# Patient Record
Sex: MALE | Race: WHITE | NOT HISPANIC OR LATINO | Employment: OTHER | URBAN - NONMETROPOLITAN AREA
[De-identification: names, ages, dates, MRNs, and addresses within clinical notes are randomized per-mention and may not be internally consistent; named-entity substitution may affect disease eponyms.]

---

## 2024-07-17 ENCOUNTER — TELEPHONE (OUTPATIENT)
Dept: CARDIOLOGY | Facility: CLINIC | Age: 64
End: 2024-07-17

## 2024-07-17 NOTE — TELEPHONE ENCOUNTER
Caller: Trenton Robbins    Relationship: Self    Best call back number: [phone]     What is the best time to reach you: ANYTIME    Who are you requesting to speak with (clinical staff, provider,  specific staff member): PROVIDER      What was the call regarding: PT JUST WANTS TO UPDATE MALATHI NICHOLS AND DR. BURROWS ABOUT SOME MEDICAL CHANGES IN THE PAST BEFORE APPT. CHANGES ARE AS FOLLOWS:  SEPTEMBER 12TH HAD STROKE -- TRANSFERRED TO [facility] AND WAS THERE FOR 12 DAYS  DR. ELIZONDO. -- DID COLON RESECTION AFTER DX OF COLON CANCER IN DECEMBER.  DX OF PROSTATE CANCER RECENTLY.  RIGHT AND LEFT CAROTID BLOCKAGES REMOVED RECENTLY AS WELL BY DR. AKASH LYNN -- SEE HER NEXT WEEK.   DR. LAN -- UROLOGIST-- WILL BE DOING UPCOMING SURGERY FOR PROSTATE CANCER.       Is it okay if the provider responds through [employer]: NO, PLEASE CALL IF NEEDED. PT DOES NOT NEED A CALL BACK NECESSARILY, JUST WANTED TO UPDATE PROVIDERS.

## 2024-07-18 ENCOUNTER — OFFICE VISIT (OUTPATIENT)
Dept: CARDIOLOGY | Facility: CLINIC | Age: 64
End: 2024-07-18
Payer: MEDICARE

## 2024-07-18 VITALS
DIASTOLIC BLOOD PRESSURE: 92 MMHG | WEIGHT: 216 LBS | BODY MASS INDEX: 31.99 KG/M2 | SYSTOLIC BLOOD PRESSURE: 161 MMHG | OXYGEN SATURATION: 97 % | HEIGHT: 69 IN

## 2024-07-18 DIAGNOSIS — I65.23 BILATERAL CAROTID ARTERY STENOSIS: Primary | ICD-10-CM

## 2024-07-18 DIAGNOSIS — E78.00 PURE HYPERCHOLESTEROLEMIA: ICD-10-CM

## 2024-07-18 DIAGNOSIS — I10 PRIMARY HYPERTENSION: ICD-10-CM

## 2024-07-18 PROCEDURE — 3080F DIAST BP >= 90 MM HG: CPT | Performed by: PHYSICIAN ASSISTANT

## 2024-07-18 PROCEDURE — 3077F SYST BP >= 140 MM HG: CPT | Performed by: PHYSICIAN ASSISTANT

## 2024-07-18 PROCEDURE — 99214 OFFICE O/P EST MOD 30 MIN: CPT | Performed by: PHYSICIAN ASSISTANT

## 2024-07-18 RX ORDER — AMLODIPINE BESYLATE 10 MG/1
10 TABLET ORAL DAILY
COMMUNITY

## 2024-07-18 NOTE — PROGRESS NOTES
Problem list     Subjective   SAADIA Robbins is a 63 y.o. male     Chief Complaint   Patient presents with    Yearly follow up     Primary HTN   Problem list  1.  CVA  1.1 acute infarct of the posterior limb of the right internal capsule near the expected location of the corticospinal tract September 2023  1.2 echocardiogram with no evidence of intracardiac thrombus nor findings to suggest PFO or ASD  1.3 bilateral carotid artery stenosis with high-grade disease involving the left and right carotid with high-grade disease involving the vertebrals  1.4 status post left and right endarterectomies performed in 2024 by Dr. Rojas, inadequate data  2.  Hypertension  3.  Dyslipidemia  4.  Prostate cancer    HPI    Patient is a 63-year-old male presenting to the office for routine cardiac assessment.  Patient feels remarkably well.  Patient is currently undergoing treatment regards to prostate cancer.    He has no chest pain or shortness of breath at all.  He feels no discomfort or any type of dyspnea in any regard.  He does not describe PND or orthopnea.    Patient does not describe palpitating nor does patient complain of dysrhythmic symptoms.  Patient is stable otherwise.      Since patient was seen last, he did undergo hospitalization due to CVA.  Patient recovered.  He ultimately had bilateral carotid endarterectomies this year.  Patient has had no recurrent events to suggest embolic phenomena.  Patient on appropriate medical regimen.  He feels stable.  He does not describe any palpitations.  Otherwise, patient is stable.    Current Outpatient Medications on File Prior to Visit   Medication Sig Dispense Refill    amLODIPine (NORVASC) 10 MG tablet Take 1 tablet by mouth Daily.      aspirin 81 MG EC tablet Take 1 tablet by mouth Daily. 30 tablet 5    levothyroxine (SYNTHROID, LEVOTHROID) 75 MCG tablet Take 1 tablet by mouth Daily.      losartan (COZAAR) 100 MG tablet Take 1 tablet by mouth Daily.      metoprolol tartrate  (LOPRESSOR) 25 MG tablet Take 1 tablet by mouth 2 (Two) Times a Day.      Rosuvastatin Calcium 20 MG capsule sprinkle Take 20 mg by mouth Daily.      Polyethylene Glycol powder       vitamin B-12 (CYANOCOBALAMIN) 500 MCG tablet Take 1 tablet by mouth Daily.      [DISCONTINUED] amLODIPine (NORVASC) 5 MG tablet Take 1 tablet by mouth Daily. (Patient taking differently: Take  by mouth Daily.) 30 tablet 11     No current facility-administered medications on file prior to visit.       Patient has no known allergies.    Past Medical History:   Diagnosis Date    History of underactive thyroid     Hypertension     Prostate cancer     Stroke 09/2023       Social History     Socioeconomic History    Marital status: Single   Tobacco Use    Smoking status: Former     Types: Cigarettes    Smokeless tobacco: Never   Substance and Sexual Activity    Alcohol use: Yes     Alcohol/week: 6.0 standard drinks of alcohol     Types: 6 Cans of beer per week    Drug use: Never    Sexual activity: Defer       Family History   Problem Relation Age of Onset    Atrial fibrillation Mother        Review of Systems   Constitutional: Negative.  Negative for activity change, appetite change, chills, fatigue and fever.   HENT: Negative.  Negative for congestion, sinus pressure and sinus pain.    Eyes: Negative.  Negative for visual disturbance.   Respiratory:  Negative for apnea, cough, chest tightness, shortness of breath and wheezing.    Cardiovascular: Negative.  Negative for chest pain, palpitations and leg swelling.   Gastrointestinal: Negative.  Negative for blood in stool.   Endocrine: Negative.  Negative for cold intolerance and heat intolerance.   Genitourinary: Negative.  Negative for hematuria.   Musculoskeletal:  Negative for gait problem.   Skin: Negative.  Negative for color change, rash and wound.   Allergic/Immunologic: Negative.  Negative for environmental allergies and food allergies.   Neurological:  Negative for dizziness,  "syncope, weakness, light-headedness and headaches.   Hematological: Negative.  Does not bruise/bleed easily.   Psychiatric/Behavioral:  Positive for sleep disturbance.        Objective   Vitals:    07/18/24 1309   BP: 161/92   BP Location: Left arm   Patient Position: Sitting   Cuff Size: Adult   SpO2: 97%   Weight: 98 kg (216 lb)   Height: 175.3 cm (69\")      /92 (BP Location: Left arm, Patient Position: Sitting, Cuff Size: Adult)   Ht 175.3 cm (69\")   Wt 98 kg (216 lb)   SpO2 97%   BMI 31.90 kg/m²     Lab Results (most recent)       None            Physical Exam  Vitals and nursing note reviewed.   Constitutional:       General: He is not in acute distress.     Appearance: Normal appearance. He is well-developed.   HENT:      Head: Normocephalic and atraumatic.   Eyes:      General: No scleral icterus.        Right eye: No discharge.         Left eye: No discharge.      Conjunctiva/sclera: Conjunctivae normal.   Neck:      Vascular: No carotid bruit.   Cardiovascular:      Rate and Rhythm: Normal rate and regular rhythm.      Heart sounds: Normal heart sounds. No murmur heard.     No friction rub. No gallop.   Pulmonary:      Effort: Pulmonary effort is normal. No respiratory distress.      Breath sounds: Normal breath sounds. No wheezing or rales.   Chest:      Chest wall: No tenderness.   Musculoskeletal:      Right lower leg: No edema.      Left lower leg: No edema.   Skin:     General: Skin is warm and dry.      Coloration: Skin is not pale.      Findings: No erythema or rash.   Neurological:      Mental Status: He is alert and oriented to person, place, and time.      Cranial Nerves: No cranial nerve deficit.   Psychiatric:         Behavior: Behavior normal.         Procedure   Procedures       Assessment & Plan     Problems Addressed this Visit          Cardiac and Vasculature    Stenosis of carotid artery - Primary    Pure hypercholesterolemia    Primary hypertension    Relevant Medications    " amLODIPine (NORVASC) 10 MG tablet     Diagnoses         Codes Comments    Bilateral carotid artery stenosis    -  Primary ICD-10-CM: I65.23  ICD-9-CM: 433.10, 433.30     Primary hypertension     ICD-10-CM: I10  ICD-9-CM: 401.9     Pure hypercholesterolemia     ICD-10-CM: E78.00  ICD-9-CM: 272.0             Recommendations  1.  Patient is a 63-year-old male with bilateral carotid artery stenosis status post endarterectomy.  I reviewed last lipid parameters and they are controlled.  Patient doing well with no recurrent events.  There is vertebral disease.  Patient is following with Dr. Bob banda who is managing his vascular disease.  Patient is to undergo carotid duplex soon.  In regards to CVA, it is very likely that he has high-grade carotid and vertebral disease were contributing.  Patient does not complain of palpitations.  We discussed cardiac testing but he is not interested.  I will consider event monitor in the future just to evaluate and rule out atrial fibrillation but he does not complain of any recurrent events and is not complaining of palpitations with high-grade vertebral and carotid disease discovered.  Patient is following with vascular surgery.  We can monitor for now.    2.  Patient with baseline hypertension.  It is elevated today but his blood pressures were better at home.  For now, we can monitor.    3.  Patient with dyslipidemia, but again, levels are well-controlled.  Patient is doing well otherwise and we will monitor.    4.  For now, patient has no chest pain or dyspnea.  We discussed cardiac testing.  The only reason we would essentially consider stress test was because of his high-grade vascular disease but he has no symptoms.  He feels he is doing well and is undergoing prostate cancer treatment.  For now, we can see him back for follow-up in 6 months and counseled him in regards to symptoms to monitor for such as chest pain and dyspnea.  If he were to experience this, will him to  call the office.  Follow-up with primary as scheduled.         Patient brought in medicine list to appointment, it's been reviewed with patient and med list was updated in the chart.      Advance Care Planning   ACP discussion was declined by the patient. Patient does not have an advance directive, declines further assistance.        Electronically signed by:

## 2024-07-18 NOTE — LETTER
July 18, 2024       No Recipients    Patient: SAADIA Robbins   YOB: 1960   Date of Visit: 7/18/2024       Dear SETH Diaz    SAADIA Robbins was in my office today. Below is a copy of my note.    If you have questions, please do not hesitate to call me. I look forward to following SAADIA along with you.         Sincerely,        MYRIAM To        CC:   No Recipients    Problem list     Subjective  SAADIA Robbins is a 63 y.o. male     Chief Complaint   Patient presents with   • Yearly follow up     Primary HTN   Problem list  1.  CVA  1.1 acute infarct of the posterior limb of the right internal capsule near the expected location of the corticospinal tract September 2023  1.2 echocardiogram with no evidence of intracardiac thrombus nor findings to suggest PFO or ASD  1.3 bilateral carotid artery stenosis with high-grade disease involving the left and right carotid with high-grade disease involving the vertebrals  1.4 status post left and right endarterectomies performed in 2024 by Dr. Rojas, inadequate data  2.  Hypertension  3.  Dyslipidemia  4.  Prostate cancer    HPI    Patient is a 63-year-old male presenting to the office for routine cardiac assessment.  Patient feels remarkably well.  Patient is currently undergoing treatment regards to prostate cancer.    He has no chest pain or shortness of breath at all.  He feels no discomfort or any type of dyspnea in any regard.  He does not describe PND or orthopnea.    Patient does not describe palpitating nor does patient complain of dysrhythmic symptoms.  Patient is stable otherwise.      Since patient was seen last, he did undergo hospitalization due to CVA.  Patient recovered.  He ultimately had bilateral carotid endarterectomies this year.  Patient has had no recurrent events to suggest embolic phenomena.  Patient on appropriate medical regimen.  He feels stable.  He does not describe any palpitations.  Otherwise, patient is  stable.    Current Outpatient Medications on File Prior to Visit   Medication Sig Dispense Refill   • amLODIPine (NORVASC) 10 MG tablet Take 1 tablet by mouth Daily.     • aspirin 81 MG EC tablet Take 1 tablet by mouth Daily. 30 tablet 5   • levothyroxine (SYNTHROID, LEVOTHROID) 75 MCG tablet Take 1 tablet by mouth Daily.     • losartan (COZAAR) 100 MG tablet Take 1 tablet by mouth Daily.     • metoprolol tartrate (LOPRESSOR) 25 MG tablet Take 1 tablet by mouth 2 (Two) Times a Day.     • Rosuvastatin Calcium 20 MG capsule sprinkle Take 20 mg by mouth Daily.     • Polyethylene Glycol powder      • vitamin B-12 (CYANOCOBALAMIN) 500 MCG tablet Take 1 tablet by mouth Daily.     • [DISCONTINUED] amLODIPine (NORVASC) 5 MG tablet Take 1 tablet by mouth Daily. (Patient taking differently: Take  by mouth Daily.) 30 tablet 11     No current facility-administered medications on file prior to visit.       Patient has no known allergies.    Past Medical History:   Diagnosis Date   • History of underactive thyroid    • Hypertension    • Prostate cancer    • Stroke 09/2023       Social History     Socioeconomic History   • Marital status: Single   Tobacco Use   • Smoking status: Former     Types: Cigarettes   • Smokeless tobacco: Never   Substance and Sexual Activity   • Alcohol use: Yes     Alcohol/week: 6.0 standard drinks of alcohol     Types: 6 Cans of beer per week   • Drug use: Never   • Sexual activity: Defer       Family History   Problem Relation Age of Onset   • Atrial fibrillation Mother        Review of Systems   Constitutional: Negative.  Negative for activity change, appetite change, chills, fatigue and fever.   HENT: Negative.  Negative for congestion, sinus pressure and sinus pain.    Eyes: Negative.  Negative for visual disturbance.   Respiratory:  Negative for apnea, cough, chest tightness, shortness of breath and wheezing.    Cardiovascular: Negative.  Negative for chest pain, palpitations and leg swelling.  "  Gastrointestinal: Negative.  Negative for blood in stool.   Endocrine: Negative.  Negative for cold intolerance and heat intolerance.   Genitourinary: Negative.  Negative for hematuria.   Musculoskeletal:  Negative for gait problem.   Skin: Negative.  Negative for color change, rash and wound.   Allergic/Immunologic: Negative.  Negative for environmental allergies and food allergies.   Neurological:  Negative for dizziness, syncope, weakness, light-headedness and headaches.   Hematological: Negative.  Does not bruise/bleed easily.   Psychiatric/Behavioral:  Positive for sleep disturbance.        Objective  Vitals:    07/18/24 1309   BP: 161/92   BP Location: Left arm   Patient Position: Sitting   Cuff Size: Adult   SpO2: 97%   Weight: 98 kg (216 lb)   Height: 175.3 cm (69\")      /92 (BP Location: Left arm, Patient Position: Sitting, Cuff Size: Adult)   Ht 175.3 cm (69\")   Wt 98 kg (216 lb)   SpO2 97%   BMI 31.90 kg/m²     Lab Results (most recent)       None            Physical Exam  Vitals and nursing note reviewed.   Constitutional:       General: He is not in acute distress.     Appearance: Normal appearance. He is well-developed.   HENT:      Head: Normocephalic and atraumatic.   Eyes:      General: No scleral icterus.        Right eye: No discharge.         Left eye: No discharge.      Conjunctiva/sclera: Conjunctivae normal.   Neck:      Vascular: No carotid bruit.   Cardiovascular:      Rate and Rhythm: Normal rate and regular rhythm.      Heart sounds: Normal heart sounds. No murmur heard.     No friction rub. No gallop.   Pulmonary:      Effort: Pulmonary effort is normal. No respiratory distress.      Breath sounds: Normal breath sounds. No wheezing or rales.   Chest:      Chest wall: No tenderness.   Musculoskeletal:      Right lower leg: No edema.      Left lower leg: No edema.   Skin:     General: Skin is warm and dry.      Coloration: Skin is not pale.      Findings: No erythema or rash. "   Neurological:      Mental Status: He is alert and oriented to person, place, and time.      Cranial Nerves: No cranial nerve deficit.   Psychiatric:         Behavior: Behavior normal.         Procedure  Procedures       Assessment & Plan    Problems Addressed this Visit          Cardiac and Vasculature    Stenosis of carotid artery - Primary    Pure hypercholesterolemia    Primary hypertension    Relevant Medications    amLODIPine (NORVASC) 10 MG tablet     Diagnoses         Codes Comments    Bilateral carotid artery stenosis    -  Primary ICD-10-CM: I65.23  ICD-9-CM: 433.10, 433.30     Primary hypertension     ICD-10-CM: I10  ICD-9-CM: 401.9     Pure hypercholesterolemia     ICD-10-CM: E78.00  ICD-9-CM: 272.0             Recommendations  1.  Patient is a 63-year-old male with bilateral carotid artery stenosis status post endarterectomy.  I reviewed last lipid parameters and they are controlled.  Patient doing well with no recurrent events.  There is vertebral disease.  Patient is following with Dr. Bob banda who is managing his vascular disease.  Patient is to undergo carotid duplex soon.  In regards to CVA, it is very likely that he has high-grade carotid and vertebral disease were contributing.  Patient does not complain of palpitations.  We discussed cardiac testing but he is not interested.  I will consider event monitor in the future just to evaluate and rule out atrial fibrillation but he does not complain of any recurrent events and is not complaining of palpitations with high-grade vertebral and carotid disease discovered.  Patient is following with vascular surgery.  We can monitor for now.    2.  Patient with baseline hypertension.  It is elevated today but his blood pressures were better at home.  For now, we can monitor.    3.  Patient with dyslipidemia, but again, levels are well-controlled.  Patient is doing well otherwise and we will monitor.    4.  For now, patient has no chest pain or  dyspnea.  We discussed cardiac testing.  The only reason we would essentially consider stress test was because of his high-grade vascular disease but he has no symptoms.  He feels he is doing well and is undergoing prostate cancer treatment.  For now, we can see him back for follow-up in 6 months and counseled him in regards to symptoms to monitor for such as chest pain and dyspnea.  If he were to experience this, will him to call the office.  Follow-up with primary as scheduled.         Patient brought in medicine list to appointment, it's been reviewed with patient and med list was updated in the chart.      Advance Care Planning  ACP discussion was declined by the patient. Patient does not have an advance directive, declines further assistance.        Electronically signed by:

## 2025-02-27 ENCOUNTER — OFFICE VISIT (OUTPATIENT)
Dept: CARDIOLOGY | Facility: CLINIC | Age: 65
End: 2025-02-27
Payer: MEDICARE

## 2025-02-27 VITALS
HEIGHT: 69 IN | SYSTOLIC BLOOD PRESSURE: 121 MMHG | DIASTOLIC BLOOD PRESSURE: 73 MMHG | WEIGHT: 211 LBS | HEART RATE: 91 BPM | OXYGEN SATURATION: 94 % | BODY MASS INDEX: 31.25 KG/M2

## 2025-02-27 DIAGNOSIS — I10 PRIMARY HYPERTENSION: ICD-10-CM

## 2025-02-27 DIAGNOSIS — I65.29 STENOSIS OF CAROTID ARTERY, UNSPECIFIED LATERALITY: Primary | ICD-10-CM

## 2025-02-27 DIAGNOSIS — E78.00 PURE HYPERCHOLESTEROLEMIA: ICD-10-CM

## 2025-02-27 PROCEDURE — 99213 OFFICE O/P EST LOW 20 MIN: CPT | Performed by: PHYSICIAN ASSISTANT

## 2025-02-27 PROCEDURE — 3074F SYST BP LT 130 MM HG: CPT | Performed by: PHYSICIAN ASSISTANT

## 2025-02-27 PROCEDURE — 3078F DIAST BP <80 MM HG: CPT | Performed by: PHYSICIAN ASSISTANT

## 2025-02-27 RX ORDER — PANTOPRAZOLE SODIUM 40 MG/1
TABLET, DELAYED RELEASE ORAL
COMMUNITY
Start: 2025-02-11

## 2025-02-27 RX ORDER — TAMSULOSIN HYDROCHLORIDE 0.4 MG/1
CAPSULE ORAL
COMMUNITY
Start: 2024-10-31

## 2025-02-27 NOTE — LETTER
March 3, 2025     SETH Erazo  [address] [facility] [address]  [place] [address]    Patient: SAADIA Robbins   YOB: 1960   Date of Visit: 2/27/2025       Dear SETH Erazo    SAADIA Robbins was in my office today. Below is a copy of my note.    If you have questions, please do not hesitate to call me. I look forward to following SAADIA along with you.         Sincerely,        MYRIAM To        CC: No Recipients    Problem list     Subjective  SAADIA Robbins is a 64 y.o. male     Chief Complaint   Patient presents with   • 6 month follow up     Primary HTN     Problem list  1.  CVA  1.1 acute infarct of the posterior limb of the right internal capsule near the expected location of the corticospinal tract September 2023  1.2 echocardiogram with no evidence of intracardiac thrombus nor findings to suggest PFO or ASD  1.3 bilateral carotid artery stenosis with high-grade disease involving the left and right carotid with high-grade disease involving the vertebrals  1.4 status post left and right endarterectomies performed in 2024 by Dr. Rojas, inadequate data  2.  Hypertension  3.  Dyslipidemia  4.  Prostate cancer     HPI    Patient is a 58-year-old male presenting back to the office for routine cardiac assessment.  He has done well.  He does not complain of any chest pain or pressure.  No complaints of dyspnea, PND, orthopnea.    He does not describe palpitating nor does he complain of dysrhythmic symptoms.  He has done remarkably well.      Current Outpatient Medications on File Prior to Visit   Medication Sig Dispense Refill   • amLODIPine (NORVASC) 10 MG tablet Take 1 tablet by mouth Daily.     • aspirin 81 MG EC tablet Take 1 tablet by mouth Daily. 30 tablet 5   • levothyroxine (SYNTHROID, LEVOTHROID) 75 MCG tablet Take 1 tablet by mouth Daily.     • losartan (COZAAR) 100 MG tablet Take 1 tablet by mouth Daily.     • metoprolol tartrate (LOPRESSOR) 25 MG tablet Take 1 tablet by  mouth 2 (Two) Times a Day.     • pantoprazole (PROTONIX) 40 MG EC tablet      • Polyethylene Glycol powder      • Rosuvastatin Calcium 20 MG capsule sprinkle Take 20 mg by mouth Daily.     • tamsulosin (FLOMAX) 0.4 MG capsule 24 hr capsule      • vitamin B-12 (CYANOCOBALAMIN) 500 MCG tablet Take 1 tablet by mouth Daily.       No current facility-administered medications on file prior to visit.       Patient has no known allergies.    Past Medical History:   Diagnosis Date   • History of underactive thyroid    • Hypertension    • Prostate cancer    • Stroke 09/2023       Social History     Socioeconomic History   • Marital status: Single   Tobacco Use   • Smoking status: Former     Types: Cigarettes   • Smokeless tobacco: Never   Substance and Sexual Activity   • Alcohol use: Yes     Alcohol/week: 6.0 standard drinks of alcohol     Types: 6 Cans of beer per week   • Drug use: Never   • Sexual activity: Defer       Family History   Problem Relation Age of Onset   • Atrial fibrillation Mother        Review of Systems   Constitutional:  Negative for activity change, appetite change, fatigue and fever.   HENT: Negative.  Negative for congestion, sinus pressure and sinus pain.    Eyes: Negative.  Negative for visual disturbance.   Respiratory: Negative.  Negative for apnea, cough, chest tightness, shortness of breath and wheezing.    Cardiovascular: Negative.  Negative for chest pain, palpitations and leg swelling.   Gastrointestinal: Negative.  Negative for blood in stool.   Endocrine: Negative.  Negative for cold intolerance and heat intolerance.   Genitourinary: Negative.  Negative for hematuria.   Musculoskeletal: Negative.  Negative for gait problem.   Skin: Negative.  Negative for color change, rash and wound.   Allergic/Immunologic: Negative.  Negative for environmental allergies and food allergies.   Neurological:  Negative for dizziness, syncope, weakness, light-headedness, numbness and headaches.  "  Hematological: Negative.  Does not bruise/bleed easily.   Psychiatric/Behavioral: Negative.  Negative for sleep disturbance.        Objective  Vitals:    02/27/25 1440   BP: 121/73   BP Location: Left arm   Patient Position: Sitting   Cuff Size: Adult   Pulse: 91   SpO2: 94%   Weight: 95.7 kg (211 lb)   Height: 175.3 cm (69\")      /73 (BP Location: Left arm, Patient Position: Sitting, Cuff Size: Adult)   Pulse 91   Ht 175.3 cm (69\")   Wt 95.7 kg (211 lb)   SpO2 94%   BMI 31.16 kg/m²     Lab Results (most recent)       None            Physical Exam  Vitals and nursing note reviewed.   Constitutional:       General: He is not in acute distress.     Appearance: Normal appearance. He is well-developed.   HENT:      Head: Normocephalic and atraumatic.   Eyes:      General: No scleral icterus.        Right eye: No discharge.         Left eye: No discharge.      Conjunctiva/sclera: Conjunctivae normal.   Neck:      Vascular: No carotid bruit.   Cardiovascular:      Rate and Rhythm: Normal rate and regular rhythm.      Heart sounds: Normal heart sounds. No murmur heard.     No friction rub. No gallop.   Pulmonary:      Effort: Pulmonary effort is normal. No respiratory distress.      Breath sounds: Normal breath sounds. No wheezing or rales.   Chest:      Chest wall: No tenderness.   Musculoskeletal:      Right lower leg: No edema.      Left lower leg: No edema.   Skin:     General: Skin is warm and dry.      Coloration: Skin is not pale.      Findings: No erythema or rash.   Neurological:      Mental Status: He is alert and oriented to person, place, and time.      Cranial Nerves: No cranial nerve deficit.   Psychiatric:         Behavior: Behavior normal.         Procedure  Procedures       Assessment & Plan    Problems Addressed this Visit          Cardiac and Vasculature    Stenosis of carotid artery - Primary    Pure hypercholesterolemia    Primary hypertension     Diagnoses         Codes Comments    " Stenosis of carotid artery, unspecified laterality    -  Primary ICD-10-CM: I65.29  ICD-9-CM: 433.10     Primary hypertension     ICD-10-CM: I10  ICD-9-CM: 401.9     Pure hypercholesterolemia     ICD-10-CM: E78.00  ICD-9-CM: 272.0             Recommendations  1.  Patient is a 64-year-old male presented back to the office for evaluation and has had bilateral carotid endarterectomies.  He has done well.  For now, we can continue to monitor with imaging.    2.  We recommend continuing antiplatelet therapy and statin therapy for dyslipidemia.  We recommend an LDL goal less than 70.    3.  Patient with baseline hypertension doing well on medical therapy.  I will make no changes.    4.  Overall, patient appears to be doing well.  We can see him back for follow-up in 6 months or sooner if needed.  Follow-up with primary as scheduled.           SAADIA Itzel  reports that he has quit smoking. His smoking use included cigarettes. He has never used smokeless tobacco.     Patient brought in medicine list to appointment, it's been reviewed with patient and med list was updated in the chart.         Electronically signed by:

## 2025-02-27 NOTE — PROGRESS NOTES
Problem list     Subjective   SAADIA Robbins is a 64 y.o. male     Chief Complaint   Patient presents with    6 month follow up     Primary HTN     Problem list  1.  CVA  1.1 acute infarct of the posterior limb of the right internal capsule near the expected location of the corticospinal tract September 2023  1.2 echocardiogram with no evidence of intracardiac thrombus nor findings to suggest PFO or ASD  1.3 bilateral carotid artery stenosis with high-grade disease involving the left and right carotid with high-grade disease involving the vertebrals  1.4 status post left and right endarterectomies performed in 2024 by Dr. Rojas, inadequate data  2.  Hypertension  3.  Dyslipidemia  4.  Prostate cancer     HPI    Patient is a 58-year-old male presenting back to the office for routine cardiac assessment.  He has done well.  He does not complain of any chest pain or pressure.  No complaints of dyspnea, PND, orthopnea.    He does not describe palpitating nor does he complain of dysrhythmic symptoms.  He has done remarkably well.      Current Outpatient Medications on File Prior to Visit   Medication Sig Dispense Refill    amLODIPine (NORVASC) 10 MG tablet Take 1 tablet by mouth Daily.      aspirin 81 MG EC tablet Take 1 tablet by mouth Daily. 30 tablet 5    levothyroxine (SYNTHROID, LEVOTHROID) 75 MCG tablet Take 1 tablet by mouth Daily.      losartan (COZAAR) 100 MG tablet Take 1 tablet by mouth Daily.      metoprolol tartrate (LOPRESSOR) 25 MG tablet Take 1 tablet by mouth 2 (Two) Times a Day.      pantoprazole (PROTONIX) 40 MG EC tablet       Polyethylene Glycol powder       Rosuvastatin Calcium 20 MG capsule sprinkle Take 20 mg by mouth Daily.      tamsulosin (FLOMAX) 0.4 MG capsule 24 hr capsule       vitamin B-12 (CYANOCOBALAMIN) 500 MCG tablet Take 1 tablet by mouth Daily.       No current facility-administered medications on file prior to visit.       Patient has no known allergies.    Past Medical History:  "  Diagnosis Date    History of underactive thyroid     Hypertension     Prostate cancer     Stroke 09/2023       Social History     Socioeconomic History    Marital status: Single   Tobacco Use    Smoking status: Former     Types: Cigarettes    Smokeless tobacco: Never   Substance and Sexual Activity    Alcohol use: Yes     Alcohol/week: 6.0 standard drinks of alcohol     Types: 6 Cans of beer per week    Drug use: Never    Sexual activity: Defer       Family History   Problem Relation Age of Onset    Atrial fibrillation Mother        Review of Systems   Constitutional:  Negative for activity change, appetite change, fatigue and fever.   HENT: Negative.  Negative for congestion, sinus pressure and sinus pain.    Eyes: Negative.  Negative for visual disturbance.   Respiratory: Negative.  Negative for apnea, cough, chest tightness, shortness of breath and wheezing.    Cardiovascular: Negative.  Negative for chest pain, palpitations and leg swelling.   Gastrointestinal: Negative.  Negative for blood in stool.   Endocrine: Negative.  Negative for cold intolerance and heat intolerance.   Genitourinary: Negative.  Negative for hematuria.   Musculoskeletal: Negative.  Negative for gait problem.   Skin: Negative.  Negative for color change, rash and wound.   Allergic/Immunologic: Negative.  Negative for environmental allergies and food allergies.   Neurological:  Negative for dizziness, syncope, weakness, light-headedness, numbness and headaches.   Hematological: Negative.  Does not bruise/bleed easily.   Psychiatric/Behavioral: Negative.  Negative for sleep disturbance.        Objective   Vitals:    02/27/25 1440   BP: 121/73   BP Location: Left arm   Patient Position: Sitting   Cuff Size: Adult   Pulse: 91   SpO2: 94%   Weight: 95.7 kg (211 lb)   Height: 175.3 cm (69\")      /73 (BP Location: Left arm, Patient Position: Sitting, Cuff Size: Adult)   Pulse 91   Ht 175.3 cm (69\")   Wt 95.7 kg (211 lb)   SpO2 94%   " BMI 31.16 kg/m²     Lab Results (most recent)       None            Physical Exam  Vitals and nursing note reviewed.   Constitutional:       General: He is not in acute distress.     Appearance: Normal appearance. He is well-developed.   HENT:      Head: Normocephalic and atraumatic.   Eyes:      General: No scleral icterus.        Right eye: No discharge.         Left eye: No discharge.      Conjunctiva/sclera: Conjunctivae normal.   Neck:      Vascular: No carotid bruit.   Cardiovascular:      Rate and Rhythm: Normal rate and regular rhythm.      Heart sounds: Normal heart sounds. No murmur heard.     No friction rub. No gallop.   Pulmonary:      Effort: Pulmonary effort is normal. No respiratory distress.      Breath sounds: Normal breath sounds. No wheezing or rales.   Chest:      Chest wall: No tenderness.   Musculoskeletal:      Right lower leg: No edema.      Left lower leg: No edema.   Skin:     General: Skin is warm and dry.      Coloration: Skin is not pale.      Findings: No erythema or rash.   Neurological:      Mental Status: He is alert and oriented to person, place, and time.      Cranial Nerves: No cranial nerve deficit.   Psychiatric:         Behavior: Behavior normal.         Procedure   Procedures       Assessment & Plan     Problems Addressed this Visit          Cardiac and Vasculature    Stenosis of carotid artery - Primary    Pure hypercholesterolemia    Primary hypertension     Diagnoses         Codes Comments    Stenosis of carotid artery, unspecified laterality    -  Primary ICD-10-CM: I65.29  ICD-9-CM: 433.10     Primary hypertension     ICD-10-CM: I10  ICD-9-CM: 401.9     Pure hypercholesterolemia     ICD-10-CM: E78.00  ICD-9-CM: 272.0             Recommendations  1.  Patient is a 64-year-old male presented back to the office for evaluation and has had bilateral carotid endarterectomies.  He has done well.  For now, we can continue to monitor with imaging.    2.  We recommend continuing  antiplatelet therapy and statin therapy for dyslipidemia.  We recommend an LDL goal less than 70.    3.  Patient with baseline hypertension doing well on medical therapy.  I will make no changes.    4.  Overall, patient appears to be doing well.  We can see him back for follow-up in 6 months or sooner if needed.  Follow-up with primary as scheduled.           SAADIA Robbins  reports that he has quit smoking. His smoking use included cigarettes. He has never used smokeless tobacco.     Patient brought in medicine list to appointment, it's been reviewed with patient and med list was updated in the chart.         Electronically signed by: